# Patient Record
Sex: MALE | Race: WHITE | NOT HISPANIC OR LATINO | Employment: FULL TIME | URBAN - METROPOLITAN AREA
[De-identification: names, ages, dates, MRNs, and addresses within clinical notes are randomized per-mention and may not be internally consistent; named-entity substitution may affect disease eponyms.]

---

## 2022-08-30 ENCOUNTER — ANESTHESIA (OUTPATIENT)
Dept: PERIOP | Facility: HOSPITAL | Age: 47
End: 2022-08-30

## 2022-08-30 ENCOUNTER — HOSPITAL ENCOUNTER (OUTPATIENT)
Dept: PERIOP | Facility: HOSPITAL | Age: 47
Setting detail: OUTPATIENT SURGERY
Discharge: HOME | End: 2022-08-30
Attending: INTERNAL MEDICINE
Payer: COMMERCIAL

## 2022-08-30 ENCOUNTER — ANESTHESIA EVENT (OUTPATIENT)
Dept: PERIOP | Facility: HOSPITAL | Age: 47
End: 2022-08-30

## 2022-08-30 VITALS
RESPIRATION RATE: 15 BRPM | HEART RATE: 95 BPM | WEIGHT: 165 LBS | HEIGHT: 67 IN | OXYGEN SATURATION: 95 % | DIASTOLIC BLOOD PRESSURE: 76 MMHG | TEMPERATURE: 98 F | SYSTOLIC BLOOD PRESSURE: 162 MMHG | BODY MASS INDEX: 25.9 KG/M2

## 2022-08-30 DIAGNOSIS — R11.2 POST-OPERATIVE NAUSEA AND VOMITING: ICD-10-CM

## 2022-08-30 DIAGNOSIS — Z98.890 POST-OPERATIVE NAUSEA AND VOMITING: ICD-10-CM

## 2022-08-30 DIAGNOSIS — K44.9 HIATAL HERNIA: ICD-10-CM

## 2022-08-30 DIAGNOSIS — K21.9 GASTROESOPHAGEAL REFLUX DISEASE, UNSPECIFIED WHETHER ESOPHAGITIS PRESENT: ICD-10-CM

## 2022-08-30 DIAGNOSIS — G89.18 POST-OPERATIVE PAIN: Primary | ICD-10-CM

## 2022-08-30 PROCEDURE — 43450 DILATE ESOPHAGUS 1/MULT PASS: CPT | Performed by: INTERNAL MEDICINE

## 2022-08-30 PROCEDURE — 43210 EGD ESOPHAGOGASTRC FNDOPLSTY: CPT | Performed by: INTERNAL MEDICINE

## 2022-08-30 RX ORDER — GLYCOPYRROLATE 0.2 MG/ML
INJECTION INTRAMUSCULAR; INTRAVENOUS AS NEEDED
Status: DISCONTINUED | OUTPATIENT
Start: 2022-08-30 | End: 2022-08-30

## 2022-08-30 RX ORDER — SUCCINYLCHOLINE/SOD CL,ISO/PF 100 MG/5ML
SYRINGE (ML) INTRAVENOUS AS NEEDED
Status: DISCONTINUED | OUTPATIENT
Start: 2022-08-30 | End: 2022-08-30

## 2022-08-30 RX ORDER — PROPOFOL 10 MG/ML
INJECTION, EMULSION INTRAVENOUS CONTINUOUS PRN
Status: DISCONTINUED | OUTPATIENT
Start: 2022-08-30 | End: 2022-08-30

## 2022-08-30 RX ORDER — ACETAMINOPHEN AND CODEINE PHOSPHATE 300; 30 MG/1; MG/1
1 TABLET ORAL EVERY 4 HOURS PRN
Qty: 30 TABLET | Refills: 0 | Status: SHIPPED | OUTPATIENT
Start: 2022-08-30 | End: 2022-09-09

## 2022-08-30 RX ORDER — ONDANSETRON 2 MG/ML
4 INJECTION INTRAMUSCULAR; INTRAVENOUS ONCE AS NEEDED
Status: CANCELLED | OUTPATIENT
Start: 2022-08-30

## 2022-08-30 RX ORDER — FENTANYL CITRATE/PF 50 MCG/ML
25 SYRINGE (ML) INJECTION
Status: CANCELLED | OUTPATIENT
Start: 2022-08-30

## 2022-08-30 RX ORDER — DEXAMETHASONE SODIUM PHOSPHATE 10 MG/ML
INJECTION, SOLUTION INTRAMUSCULAR; INTRAVENOUS AS NEEDED
Status: DISCONTINUED | OUTPATIENT
Start: 2022-08-30 | End: 2022-08-30

## 2022-08-30 RX ORDER — ALBUTEROL SULFATE 2.5 MG/3ML
2.5 SOLUTION RESPIRATORY (INHALATION) ONCE AS NEEDED
Status: CANCELLED | OUTPATIENT
Start: 2022-08-30

## 2022-08-30 RX ORDER — ROCURONIUM BROMIDE 10 MG/ML
INJECTION, SOLUTION INTRAVENOUS AS NEEDED
Status: DISCONTINUED | OUTPATIENT
Start: 2022-08-30 | End: 2022-08-30

## 2022-08-30 RX ORDER — HYDROMORPHONE HCL/PF 1 MG/ML
0.5 SYRINGE (ML) INJECTION
Status: CANCELLED | OUTPATIENT
Start: 2022-08-30

## 2022-08-30 RX ORDER — FENTANYL CITRATE 50 UG/ML
INJECTION, SOLUTION INTRAMUSCULAR; INTRAVENOUS AS NEEDED
Status: DISCONTINUED | OUTPATIENT
Start: 2022-08-30 | End: 2022-08-30

## 2022-08-30 RX ORDER — SUCRALFATE ORAL 1 G/10ML
1 SUSPENSION ORAL 4 TIMES DAILY
Qty: 414 ML | Refills: 0 | Status: SHIPPED | OUTPATIENT
Start: 2022-08-30 | End: 2022-09-16 | Stop reason: SDUPTHER

## 2022-08-30 RX ORDER — ONDANSETRON 2 MG/ML
INJECTION INTRAMUSCULAR; INTRAVENOUS AS NEEDED
Status: DISCONTINUED | OUTPATIENT
Start: 2022-08-30 | End: 2022-08-30

## 2022-08-30 RX ORDER — LEVOFLOXACIN 5 MG/ML
500 INJECTION, SOLUTION INTRAVENOUS ONCE
Status: COMPLETED | OUTPATIENT
Start: 2022-08-30 | End: 2022-08-30

## 2022-08-30 RX ORDER — LIDOCAINE HYDROCHLORIDE 10 MG/ML
INJECTION, SOLUTION EPIDURAL; INFILTRATION; INTRACAUDAL; PERINEURAL AS NEEDED
Status: DISCONTINUED | OUTPATIENT
Start: 2022-08-30 | End: 2022-08-30

## 2022-08-30 RX ORDER — SODIUM CHLORIDE, SODIUM LACTATE, POTASSIUM CHLORIDE, CALCIUM CHLORIDE 600; 310; 30; 20 MG/100ML; MG/100ML; MG/100ML; MG/100ML
INJECTION, SOLUTION INTRAVENOUS CONTINUOUS PRN
Status: DISCONTINUED | OUTPATIENT
Start: 2022-08-30 | End: 2022-08-30

## 2022-08-30 RX ORDER — PROPOFOL 10 MG/ML
INJECTION, EMULSION INTRAVENOUS AS NEEDED
Status: DISCONTINUED | OUTPATIENT
Start: 2022-08-30 | End: 2022-08-30

## 2022-08-30 RX ORDER — MEPERIDINE HYDROCHLORIDE 25 MG/ML
12.5 INJECTION INTRAMUSCULAR; INTRAVENOUS; SUBCUTANEOUS
Status: CANCELLED | OUTPATIENT
Start: 2022-08-30

## 2022-08-30 RX ORDER — PROMETHAZINE HYDROCHLORIDE 25 MG/ML
12.5 INJECTION, SOLUTION INTRAMUSCULAR; INTRAVENOUS ONCE AS NEEDED
Status: CANCELLED | OUTPATIENT
Start: 2022-08-30

## 2022-08-30 RX ORDER — ONDANSETRON 4 MG/1
4 TABLET, FILM COATED ORAL EVERY 8 HOURS PRN
Qty: 20 TABLET | Refills: 0 | Status: SHIPPED | OUTPATIENT
Start: 2022-08-30

## 2022-08-30 RX ADMIN — Medication 50 MG: at 14:06

## 2022-08-30 RX ADMIN — ROCURONIUM BROMIDE 10 MG: 50 INJECTION, SOLUTION INTRAVENOUS at 14:25

## 2022-08-30 RX ADMIN — DEXAMETHASONE SODIUM PHOSPHATE 10 MG: 10 INJECTION INTRAMUSCULAR; INTRAVENOUS at 14:11

## 2022-08-30 RX ADMIN — ROCURONIUM BROMIDE 10 MG: 50 INJECTION, SOLUTION INTRAVENOUS at 14:13

## 2022-08-30 RX ADMIN — ONDANSETRON 4 MG: 2 INJECTION INTRAMUSCULAR; INTRAVENOUS at 14:11

## 2022-08-30 RX ADMIN — PROPOFOL 150 MCG/KG/MIN: 10 INJECTION, EMULSION INTRAVENOUS at 13:52

## 2022-08-30 RX ADMIN — PROPOFOL 200 MG: 10 INJECTION, EMULSION INTRAVENOUS at 13:50

## 2022-08-30 RX ADMIN — SODIUM CHLORIDE, SODIUM LACTATE, POTASSIUM CHLORIDE, AND CALCIUM CHLORIDE: .6; .31; .03; .02 INJECTION, SOLUTION INTRAVENOUS at 13:50

## 2022-08-30 RX ADMIN — FENTANYL CITRATE 50 MCG: 50 INJECTION, SOLUTION INTRAMUSCULAR; INTRAVENOUS at 14:14

## 2022-08-30 RX ADMIN — ROCURONIUM BROMIDE 10 MG: 50 INJECTION, SOLUTION INTRAVENOUS at 14:05

## 2022-08-30 RX ADMIN — Medication 100 MG: at 13:50

## 2022-08-30 RX ADMIN — FENTANYL CITRATE 50 MCG: 50 INJECTION, SOLUTION INTRAMUSCULAR; INTRAVENOUS at 13:50

## 2022-08-30 RX ADMIN — LEVOFLOXACIN: 5 INJECTION, SOLUTION INTRAVENOUS at 13:51

## 2022-08-30 RX ADMIN — GLYCOPYRROLATE 0.2 MCG: 0.2 INJECTION, SOLUTION INTRAMUSCULAR; INTRAVENOUS at 14:06

## 2022-08-30 RX ADMIN — LIDOCAINE HYDROCHLORIDE 50 MG: 10 INJECTION, SOLUTION EPIDURAL; INFILTRATION; INTRACAUDAL at 13:50

## 2022-08-30 RX ADMIN — ROCURONIUM BROMIDE 10 MG: 50 INJECTION, SOLUTION INTRAVENOUS at 13:50

## 2022-08-30 NOTE — ANESTHESIA POSTPROCEDURE EVALUATION
Post-Op Assessment Note    CV Status:  Stable  Pain Score: 0    Pain management: adequate     Mental Status:  Sleepy   Hydration Status:  Euvolemic   PONV Controlled:  Controlled   Airway Patency:  Patent      Post Op Vitals Reviewed: Yes      Staff: Anesthesiologist, CRNA         No complications documented      BP   149/87   Temp   97   Pulse  89   Resp   16   SpO2   96

## 2022-08-30 NOTE — H&P
History and Physical -  [facility]  Shabana Kolb 51 y o  male MRN: [id number]                  HPI: Shabana Kolb is a 51y o  year old male who presents for heartburn, reflux symptoms, nausea and vomiting, patient had extensive workup at [address] and confirmed evidence of GERD, he try multiple PPI but no improvement, he is here for transoral incision less fundoplication      REVIEW OF SYSTEMS: Per the HPI, and otherwise unremarkable      Historical Information   Past Medical History:   Diagnosis Date    COVID-19 long hauler manifesting chronic cough 10/2021    Intubation x 11 weeks and PEG     Past Surgical History:   Procedure Laterality Date    COLONOSCOPY  10/2019    Diverticulosis    EGD  01/25/2022    Mild irregular squamocolumnar junction no intestinal metaplasia, gastritis prepyloric, widely patent lower esophageal sphincter, small sliding hiatal hernia, fundic gland polyps    EGD  06/02/2022    Variable Z line and biopsy negative for intestinal metaplasia, gastritis with fundic gland polyp esophagus otherwise normal at [address] ENDOSCOPY  10/2019    Possible short segment Bajwa's esophagus-biopsy negative for intestinal metaplasia, fundic gland polyps, focal eosinophils and esophagus normal duodenum     Social History   Social History     Substance and Sexual Activity   Alcohol Use Not Currently     Social History     Substance and Sexual Activity   Drug Use Not on file     Social History     Tobacco Use   Smoking Status Never Smoker   Smokeless Tobacco Never Used     Family History   Problem Relation Age of Onset    Ovarian cancer Mother        Meds/Allergies     (Not in a hospital admission)      Allergies   Allergen Reactions    Penicillins Hives    Vancomycin Chest Pain       Objective     /96   Pulse 90   Temp 99 2 °F (37 3 °C) (Temporal)   Resp 14   Ht 5' 7" (1 702 m)   Wt 74 8 kg (165 lb) SpO2 97%   BMI 25 84 kg/m²       PHYSICAL EXAM    Gen: NAD  CV: RRR  CHEST: Clear  ABD: soft, NT/ND  EXT: no edema      ASSESSMENT/PLAN:  This is a 51y o  year old male here for TIF, and he is stable and optimized for his procedure

## 2022-08-30 NOTE — ANESTHESIA PREPROCEDURE EVALUATION
Procedure:  TRANSORAL INCISIONLESS FUNDOPLICATION (TIF)    Relevant Problems   No relevant active problems      GERD  Physical Exam    Airway    Mallampati score: II  TM Distance: >3 FB  Neck ROM: full     Dental       Cardiovascular  Cardiovascular exam normal    Pulmonary  Pulmonary exam normal     Other Findings        Anesthesia Plan  ASA Score- 2     Anesthesia Type- general with ASA Monitors  Additional Monitors:   Airway Plan: ETT  Plan Factors-Exercise tolerance (METS): >4 METS  Chart reviewed  EKG reviewed  Imaging results reviewed  Existing labs reviewed  Patient summary reviewed  Patient is not a current smoker  Patient did not smoke on day of surgery  Obstructive sleep apnea risk education given perioperatively  Induction- intravenous  Postoperative Plan- Plan for postoperative opioid use  Planned trial extubation    Informed Consent- Anesthetic plan and risks discussed with patient  I personally reviewed this patient with the CRNA  Discussed and agreed on the Anesthesia Plan with the SUE Wood

## 2022-09-07 ENCOUNTER — TELEPHONE (OUTPATIENT)
Dept: GASTROENTEROLOGY | Facility: CLINIC | Age: 47
End: 2022-09-07

## 2022-09-08 NOTE — TELEPHONE ENCOUNTER
Reba Sahu called  I scheduled his TIF follow up  Reba Sahu expressed understand of location/date an time of appointment  coffee coffee/tea

## 2022-09-16 ENCOUNTER — OFFICE VISIT (OUTPATIENT)
Dept: GASTROENTEROLOGY | Facility: CLINIC | Age: 47
End: 2022-09-16
Payer: COMMERCIAL

## 2022-09-16 VITALS
HEIGHT: 67 IN | BODY MASS INDEX: 25.43 KG/M2 | HEART RATE: 106 BPM | SYSTOLIC BLOOD PRESSURE: 148 MMHG | WEIGHT: 162 LBS | DIASTOLIC BLOOD PRESSURE: 107 MMHG

## 2022-09-16 DIAGNOSIS — K22.4 ESOPHAGEAL DYSMOTILITY: ICD-10-CM

## 2022-09-16 DIAGNOSIS — R05.3 COVID-19 LONG HAULER MANIFESTING CHRONIC COUGH: ICD-10-CM

## 2022-09-16 DIAGNOSIS — R13.12 OROPHARYNGEAL DYSPHAGIA: ICD-10-CM

## 2022-09-16 DIAGNOSIS — R05.9 COUGH: ICD-10-CM

## 2022-09-16 DIAGNOSIS — U09.9 COVID-19 LONG HAULER MANIFESTING CHRONIC COUGH: ICD-10-CM

## 2022-09-16 DIAGNOSIS — K21.9 GASTROESOPHAGEAL REFLUX DISEASE, UNSPECIFIED WHETHER ESOPHAGITIS PRESENT: ICD-10-CM

## 2022-09-16 DIAGNOSIS — K21.9 GASTROESOPHAGEAL REFLUX DISEASE WITHOUT ESOPHAGITIS: Primary | ICD-10-CM

## 2022-09-16 DIAGNOSIS — K44.9 HIATAL HERNIA: ICD-10-CM

## 2022-09-16 DIAGNOSIS — R07.0 THROAT BURNING: ICD-10-CM

## 2022-09-16 PROCEDURE — 99214 OFFICE O/P EST MOD 30 MIN: CPT | Performed by: INTERNAL MEDICINE

## 2022-09-16 RX ORDER — SUCRALFATE ORAL 1 G/10ML
1 SUSPENSION ORAL 4 TIMES DAILY
Qty: 414 ML | Refills: 2 | Status: SHIPPED | OUTPATIENT
Start: 2022-09-16

## 2022-09-16 NOTE — PROGRESS NOTES
[facility] - Outpatient Follow-up Note  Shabana Kolb 51 y o  male MRN: [id number]  Encounter: [id number]          ASSESSMENT AND PLAN:      1  Gastroesophageal reflux disease without esophagitis  Long history of GERD, status post TIF, transoral incision less fundoplication 2 weeks ago, patient tolerated procedure very well  Postprocedure he follow liquid diet for 1 week and now advanced to soft diet  He denies any dysphagia or odynophagia, no heartburn or reflux symptoms, he is still taking Prevacid which I advised him to discontinued, he will continue with liquid Carafate for another 2-3 weeks and then wean off from all the reflux medication  He will need repeat EGD in 6 months to evaluate for fundoplication site  - EGD; Future    2  Gastroesophageal reflux disease, unspecified whether esophagitis present  Status post TIF, 30 fastener  were applied circumferentially around gastroesophageal junction to create 902 fundoplication wrap  His reflux symptoms are well controlled currently denying any symptoms, GERD quality of life score is almost 0, no nocturnal symptoms or no trouble with swallowing  Will give liquid Carafate refill for another 1 month and then he will stop all the reflux medication  - sucralfate (CARAFATE) 1 g/10 mL suspension; Take 10 mL (1 g total) by mouth 4 (four) times a day  Dispense: 414 mL; Refill: 2    3  Hiatal hernia  Small sliding hill  grade 1 hiatal hernia, status post TIF  - sucralfate (CARAFATE) 1 g/10 mL suspension; Take 10 mL (1 g total) by mouth 4 (four) times a day  Dispense: 414 mL; Refill: 2  - EGD; Future    ______________________________________________________________________    SUBJECTIVE:  Patient seen and examined, he come for follow-up    He had a at transoral incision less fundoplication 2 weeks ago at [facility], he was referred to me by Dr Barbie Altamirano because of persistent heartburn and reflux symptoms, patient had extensive workup at [address] including manometry and pH study which confirm breakthrough reflux while on PPI and H2 blocker so TIF was recommended  He had a COVID infection last year, he was status post trach and PEG which was removed, esophageal intubation with  TIF device was challenging but a procedure was done without any complication  Postprocedure he follow TIF diet, he is feeling well, currently denies any throat pain, no abdominal pain, no heartburn or reflux symptoms, he denies any dysphagia or odynophagia      REVIEW OF SYSTEMS IS OTHERWISE NEGATIVE        Historical Information   Past Medical History:   Diagnosis Date    COVID-19 long hauler manifesting chronic cough 10/2021    Intubation x 11 weeks and PEG    GERD (gastroesophageal reflux disease)     Hiatal hernia      Past Surgical History:   Procedure Laterality Date    COLONOSCOPY  10/2019    Diverticulosis    EGD  01/25/2022    Mild irregular squamocolumnar junction no intestinal metaplasia, gastritis prepyloric, widely patent lower esophageal sphincter, small sliding hiatal hernia, fundic gland polyps    EGD  06/02/2022    Variable Z line and biopsy negative for intestinal metaplasia, gastritis with fundic gland polyp esophagus otherwise [facility]    TRANSORAL INCISIONLESS FUNDOPLICATION (TIF)  93/88/2039    UPPER GASTROINTESTINAL ENDOSCOPY  10/2019    Possible short segment Bajwa's esophagus-biopsy negative for intestinal metaplasia, fundic gland polyps, focal eosinophils and esophagus normal duodenum     Social History   Social History     Substance and Sexual Activity   Alcohol Use Not Currently     Social History     Substance and Sexual Activity   Drug Use Never     Social History     Tobacco Use   Smoking Status Never Smoker   Smokeless Tobacco Never Used     Family History   Problem Relation Age of Onset    Ovarian cancer Mother        Meds/Allergies       Current Outpatient Medications:     albuterol (PROVENTIL HFA,VENTOLIN HFA) 90 mcg/act inhaler    ALPRAZolam (XANAX) 1 mg tablet    budesonide (PULMICORT) 0 5 mg/2 mL nebulizer solution    busPIRone (BUSPAR) 5 mg tablet    famotidine (PEPCID) 40 MG tablet    fluticasone (FLONASE) 50 mcg/act nasal spray    lansoprazole (PREVACID) 30 mg capsule    sucralfate (CARAFATE) 1 g/10 mL suspension    traZODone (DESYREL) 150 mg tablet    amitriptyline (ELAVIL) 100 mg tablet    azithromycin (ZITHROMAX) 250 mg tablet    ondansetron (ZOFRAN) 4 mg tablet    promethazine-dextromethorphan (PHENERGAN-DM) 6 25-15 mg/5 mL oral syrup    tadalafil (CIALIS) 5 MG tablet    valACYclovir (VALTREX) 500 mg tablet    Allergies   Allergen Reactions    Penicillins Hives    Vancomycin Chest Pain           Objective     Blood pressure (!) 148/107, pulse (!) 106, height 5' 7" (1 702 m), weight 73 5 kg (162 lb)  Body mass index is 25 37 kg/m²  PHYSICAL EXAM:      General Appearance:   Alert, cooperative, no distress   HEENT:   Normocephalic, atraumatic, anicteric      Neck:  Supple, symmetrical, trachea midline   Lungs:   Clear to auscultation bilaterally; no rales, rhonchi or wheezing; respirations unlabored    Heart[de-identified]   Regular rate and rhythm; no murmur, rub, or gallop  Abdomen:   Soft, non-tender, non-distended; normal bowel sounds; no masses, no organomegaly    Genitalia:   Deferred    Rectal:   Deferred    Extremities:  No cyanosis, clubbing or edema    Pulses:  2+ and symmetric    Skin:  No jaundice, rashes, or lesions    Lymph nodes:  No palpable cervical lymphadenopathy        Lab Results:   No visits with results within 1 Day(s) from this visit  Latest known visit with results is:   No results found for any previous visit           Radiology Results:   Transoral incisionless fundoplication (TIF)    Result Date: 8/30/2022  Narrative: [address] [address] [phone] DATE OF SERVICE: 8/30/22 PHYSICIAN(S): Attending: Angeli Moran MD Fellow: No Staff Documented Procedure :  EGD with Transoral incision less fundoplication INDICATION: Hiatal hernia, Gastroesophageal reflux disease, unspecified whether esophagitis present POST-OP DIAGNOSIS: See the impression below  PREPROCEDURE: Informed consent was obtained for the procedure, including sedation  Risks of perforation, hemorrhage, adverse drug reaction and aspiration were discussed  The patient was placed in the left lateral decubitus position  Patient was explained about the risks and benefits of the procedure  Risks including but not limited to bleeding, infection, and perforation were explained in detail  Also explained about less than 100% sensitivity with the exam and other alternatives  DETAILS OF PROCEDURE: Patient was taken to the procedure room where a time out was performed to confirm correct patient and correct procedure  The patient underwent general anesthesia, which was administered by an anesthesia professional  The patient's blood pressure, heart rate, level of consciousness, oxygen and respirations were monitored throughout the procedure  The scope was advanced to the second part of the duodenum  Retroflexion was performed in the fundus  Prior to the procedure, the patient's H  Pylori status was unknown  The patient experienced no blood loss  The procedure was not difficult  The patient tolerated the procedure well  There were no apparent complications    ANESTHESIA INFORMATION: ASA: II Anesthesia Type: General FINDINGS: Patient was intubated with small endotracheal tube, 6 5 and then put on left lateral position  Upper endoscopy was passed through the mouth and upper esophageal sphincter was intubated  Upper, middle and lower esophagus appeared normal, GE junction was 40 cm from incisor, GE junction was carefully examined, scope was advanced in the stomach, on retroflexion, there is small sliding hill grade 1 hiatal hernia     Greater curvature, lesser curvature, fundus, gastric body, antrum was examined which appeared normal   Scope was advanced from pylorus to duodenal bulb which appeared normal, 2nd part of duodenum were duodenum villi appeared normal   Scope was withdrawn and esophyz-Z+ device was open loaded with fastener cartridge and scope channel was lubricated with mineral oil  Esophageal Bougie Dilatation done with 60 fr bougie dilator  Scope along with device was passed through the mouth, head was extended and cricopharynx was intubated with the scope  Device along with the scope was passed through the upper esophagus and advanced from GE junction into stomach, on retroflexion, tissue arm mold was visualized  Starting from 6 o'clock position, with the use of tissue invaginater, tip of the gastroesophageal junction valve was grabbed, device was rotated to clockwise to create traction on the gastroesophageal junction and, tissue arm mold was closed and 4 fasteners were applied  Tissue arm mold was rotated towards 11 o'clock position, tip of the gastroesophageal junction valve was grabbed with tissue invaginated again device was rotated clockwise to create traction on the gastroesophageal junction while an additional 6 fasteners were applied at the 10 and 11 o'clock position  Tissue arm mold was rotated towards 7 o'clock position and an additional 4 fasteners were applied  Tissue arm mold was rotated towards 5 o'clock position and 4 fasteners were applied  Device along with a tissue arm mold was rotated towards 1 o'clock position and again with tissue invaginator, gastroesophageal junction valve tip was grabbed  Device was rotated counter clockwise direction to create traction on the gastroesophageal junction and additional 8 fastener were applied at 1 and 2 o'clock position  Total of 26 fasteners were applied circumferentially around the gastroesophageal junction to create 551 degree fundoplication wrap    Scope along with the device was easily removed, scope was reintroduced and gastroesophageal junction was carefully examined  Procedure finished without any complication  Impression: Chronic GERD, small hill grade 1 hiatal hernia, status post transoral incision less fundoplication, total 26 fastener  were applied circumferentially around gastroesophageal junction to create 227 degree fundoplication wrap RECOMMENDATION: Full liquid diet for 2 weeks and then advanced to soft diet Tylenol with codeine twice a day p r n  for pain, Zofran 4 mg p o   Q 6 p r n  for nausea, famotidine 40 mg p o  q day and liquid Carafate 1 g 3 times a day Follow-up office visit in 2-3 weeks   Tatianna Romano MD

## 2023-09-13 ENCOUNTER — TELEPHONE (OUTPATIENT)
Age: 48
End: 2023-09-13

## 2023-09-13 ENCOUNTER — HOSPITAL ENCOUNTER (OUTPATIENT)
Dept: RADIOLOGY | Facility: HOSPITAL | Age: 48
Discharge: HOME | End: 2023-09-13
Attending: ORTHOPAEDIC SURGERY
Payer: COMMERCIAL

## 2023-09-13 VITALS — WEIGHT: 172 LBS | BODY MASS INDEX: 27 KG/M2 | HEIGHT: 67 IN

## 2023-09-13 DIAGNOSIS — M25.511 RIGHT SHOULDER PAIN, UNSPECIFIED CHRONICITY: ICD-10-CM

## 2023-09-13 DIAGNOSIS — M19.011 PRIMARY OSTEOARTHRITIS OF RIGHT SHOULDER: Primary | ICD-10-CM

## 2023-09-13 PROCEDURE — 73030 X-RAY EXAM OF SHOULDER: CPT

## 2023-09-13 PROCEDURE — 99204 OFFICE O/P NEW MOD 45 MIN: CPT | Performed by: ORTHOPAEDIC SURGERY

## 2023-09-13 NOTE — TELEPHONE ENCOUNTER
Caller: Patient     Doctor: Dr. Arnaud Ortiz    Reason for call: Patient calling to inform prior authorization that he has MRI scheduled for 9/21/23. Patient asking if he can be informed if the MRI is approved. He can be reached at number below.      Call back#: [phone]

## 2023-09-13 NOTE — PROGRESS NOTES
[address]  [address] [place] [id number]  Hilario Dykes  [id number]  1975    ORTHOPAEDIC SURGERY OUTPATIENT NOTE  9/13/2023      HISTORY:  49 y.o. male presents with a chief complaint of right shoulder pain. The pain began a few year(s) ago and is not associated with an acute injury. The patient describes the pain as aching and dull in intensity,  intermittent, occurring with increasing frequency, awakening patient from sleep in timing, and localizes the pain to the  right glenohumeral joint, deltoid. The pain is worse with movement, overhead work and overuse and relieved by rest, ice, avoiding the painful activities. The pain is not associated with numbness and tingling. The pain is not associated with constitutional symptoms. The patient is awoken at night by the pain. Pain level 5-6/10 on a daily basis  The patient has had prior treatment in the form of PRP, visco and cortisone injections with minimal benefit. He also notes a previous history of a labral debridement and rotator cuff repair with Dr Oscar Mills at [place] in 2011 without noted benefit of his pain.        Past Medical History:   Diagnosis Date   • COVID-19 long hauler manifesting chronic cough 10/2021    Intubation x 11 weeks and PEG   • GERD (gastroesophageal reflux disease)    • Hiatal hernia        Past Surgical History:   Procedure Laterality Date   • COLONOSCOPY  10/2019    Diverticulosis   • EGD  01/25/2022    Mild irregular squamocolumnar junction no intestinal metaplasia, gastritis prepyloric, widely patent lower esophageal sphincter, small sliding hiatal hernia, fundic gland polyps   • EGD  06/02/2022    Variable Z line and biopsy negative for intestinal metaplasia, gastritis with fundic gland polyp esophagus otherwise normal at [facility]   • TRANSORAL INCISIONLESS FUNDOPLICATION (TIF)  71/78/2579   • UPPER GASTROINTESTINAL ENDOSCOPY  10/2019    Possible short segment Bajwa's esophagus-biopsy negative for intestinal metaplasia, fundic gland polyps, focal eosinophils and esophagus normal duodenum       Social History     Socioeconomic History   • Marital status: Single     Spouse name: Not on file   • Number of children: Not on file   • Years of education: Not on file   • Highest education level: Not on file   Occupational History   • Not on file   Tobacco Use   • Smoking status: Never   • Smokeless tobacco: Never   Vaping Use   • Vaping Use: Never used   Substance and Sexual Activity   • Alcohol use: Not Currently   • Drug use: Never   • Sexual activity: Not on file   Other Topics Concern   • Not on file   Social History Narrative   • Not on file     Social Determinants of Health     Financial Resource Strain: Not on file   Food Insecurity: Not on file   Transportation Needs: Not on file   Physical Activity: Not on file   Stress: Not on file   Social Connections: Not on file   Intimate Partner Violence: Not on file   Housing Stability: Not on file       Family History   Problem Relation Age of Onset   • Ovarian cancer Mother         Patient's Medications   New Prescriptions    No medications on file   Previous Medications    ALBUTEROL (PROVENTIL HFA,VENTOLIN HFA) 90 MCG/ACT INHALER        ALPRAZOLAM (XANAX) 1 MG TABLET        AMITRIPTYLINE (ELAVIL) 100 MG TABLET        AZITHROMYCIN (ZITHROMAX) 250 MG TABLET        BUDESONIDE (PULMICORT) 0.5 MG/2 ML NEBULIZER SOLUTION    Inhale 2 mL every 12 (twelve) hours    BUSPIRONE (BUSPAR) 5 MG TABLET        FAMOTIDINE (PEPCID) 40 MG TABLET    Take 1 tablet (40 mg total) by mouth 2 (two) times a day    FLUTICASONE (FLONASE) 50 MCG/ACT NASAL SPRAY        LANSOPRAZOLE (PREVACID) 30 MG CAPSULE    Take 30 mg by mouth 2 (two) times a day    ONDANSETRON (ZOFRAN) 4 MG TABLET    Take 1 tablet (4 mg total) by mouth every 8 (eight) hours as needed for nausea or vomiting PROMETHAZINE-DEXTROMETHORPHAN (PHENERGAN-DM) 6.25-15 MG/5 ML ORAL SYRUP        SUCRALFATE (CARAFATE) 1 G/10 ML SUSPENSION    Take 10 mL (1 g total) by mouth 4 (four) times a day    TADALAFIL (CIALIS) 5 MG TABLET        TRAZODONE (DESYREL) 150 MG TABLET        VALACYCLOVIR (VALTREX) 500 MG TABLET    Take 1,000 mg by mouth   Modified Medications    No medications on file   Discontinued Medications    No medications on file       Allergies   Allergen Reactions   • Penicillins Hives   • Vancomycin Chest Pain        Ht 5' 7" (1.702 m)   Wt 78 kg (172 lb)   BMI 26.94 kg/m²      REVIEW OF SYSTEMS:  Constitutional: Negative. HEENT: Negative. Respiratory: Negative. Skin: Negative. Neurological: Negative. Psychiatric/Behavioral: Negative. Musculoskeletal: Negative except for that mentioned in the HPI. PHYSICAL EXAM:      RIGHT SHOULDER:     NVI axillary/medial/radial/ulnar and sensory intact     Forward flexion:   90 degrees   Abduction:  45 degrees   External rotation at 0 degrees:  Neutral  Internal rotation: L5     STRENGTH:  Forward flexion:  5/5   Abduction:  5/5   External rotation:  5/5   Internal rotation:  5/5     Reflexes:  Brachioradialis:  Symmetric bilaterally  Triceps: Symmetric bilaterally  Biceps: Symmetric bilaterally  Patella tendon: Symmetric bilaterally  Achilles tendon: Symmetric bilaterally  Babinski's: Negative  Alfreda sign: Negative     No scapular winging or dyskinesis     Capillary refill brisk   Full ROM of elbows bilaterally      Skin:  No ecchymosis/abrasion/erythema/warmth    IMAGING: X Ray Right Shoulder 9/13/23 demonstrates severe glenohumeral joint osteoarthritis with inferior osteophytes. MRI Right Shoulder 10/22/22, report only available to view which demonstrates long head biceps tendon rupture. Intact rotator cuff. Severe glenohumeral joint osteoarthritis. ASSESSMENT AND PLAN: 49 y.o. male with severe glenohumeral joint osteoarthritis of his right shoulder. The pathoanatomy and natural history of this diagnosis was explained to the patient in detail today in the office. The surgical procedure that would be beneficial to the patient would be a total shoulder arthroplasty, most likely an anatomic as his rotator cuff appears to be intact. We will obtain a CT scan with blue print and 3D recon of the right shoulder. If his glenoid deformity is more significant then it would be a reverse total shoulder. He understood and all questions were answered. He would like to go home and review his options but is planning on a surgical procedure for sometime in December. He was instructed to follow up 4-6 weeks prior to desired surgical date to sign informed consent.        Scribe Attestation    I,:  Mireya Arango am acting as a scribe while in the presence of the attending physician.:       I,:  Kirk Garg personally performed the services described in this documentation    as scribed in my presence.:

## 2023-09-20 NOTE — TELEPHONE ENCOUNTER
Caller: Patient    Doctor:  Cathy Pathak    Reason for call:     Patient requesting order for CT Scan for M19.011 (ICD-10-CM) - Primary osteoarthritis of right shouldered faxed to [facility], Please fax to the fax #  [phone]    Call back#: [phone]

## 2025-01-27 ENCOUNTER — TELEPHONE (OUTPATIENT)
Age: 50
End: 2025-01-27

## 2025-01-27 NOTE — TELEPHONE ENCOUNTER
Patients GI provider:  Dr. Medina    Number to return call: [phone]    Reason for call: Pt had Tif procedure with Dr. Li in 2022, states he has had trouble eating for the past 12 weeks. Since the provider is no longer with us he is looking for help. Please call him to discuss next steps for treatment and/or OV with Dr. Medina.      Scheduled procedure/appointment date if applicable: NA

## 2025-01-28 NOTE — TELEPHONE ENCOUNTER
Patient is dealing with insurance issues, requested to put appt off to following week; may call back to cancel if he is unable to get insurance to approve visit.

## 2025-01-28 NOTE — TELEPHONE ENCOUNTER
Olivier Medina MD  You 18 hours ago (3:26 PM)       Friday      You  Olivier Medina MD 19 hours ago (2:29 PM)     BEA  Do you have any specific time you would like me to schedule this patient?  Thank you,  Collette

## 2025-02-07 ENCOUNTER — TELEPHONE (OUTPATIENT)
Dept: GASTROENTEROLOGY | Facility: MEDICAL CENTER | Age: 50
End: 2025-02-07

## 2025-02-07 ENCOUNTER — OFFICE VISIT (OUTPATIENT)
Dept: GASTROENTEROLOGY | Facility: MEDICAL CENTER | Age: 50
End: 2025-02-07
Payer: COMMERCIAL

## 2025-02-07 VITALS
WEIGHT: 180.4 LBS | BODY MASS INDEX: 28.25 KG/M2 | DIASTOLIC BLOOD PRESSURE: 96 MMHG | SYSTOLIC BLOOD PRESSURE: 144 MMHG | TEMPERATURE: 96.9 F | HEART RATE: 82 BPM

## 2025-02-07 DIAGNOSIS — K21.00 GASTROESOPHAGEAL REFLUX DISEASE WITH ESOPHAGITIS WITHOUT HEMORRHAGE: ICD-10-CM

## 2025-02-07 DIAGNOSIS — K44.9 HIATAL HERNIA: ICD-10-CM

## 2025-02-07 DIAGNOSIS — Z12.11 SCREENING FOR COLON CANCER: Primary | ICD-10-CM

## 2025-02-07 DIAGNOSIS — K21.9 GASTROESOPHAGEAL REFLUX DISEASE, UNSPECIFIED WHETHER ESOPHAGITIS PRESENT: ICD-10-CM

## 2025-02-07 PROCEDURE — 99214 OFFICE O/P EST MOD 30 MIN: CPT | Performed by: INTERNAL MEDICINE

## 2025-02-07 RX ORDER — SODIUM CHLORIDE, SODIUM LACTATE, POTASSIUM CHLORIDE, CALCIUM CHLORIDE 600; 310; 30; 20 MG/100ML; MG/100ML; MG/100ML; MG/100ML
125 INJECTION, SOLUTION INTRAVENOUS CONTINUOUS
Status: CANCELLED | OUTPATIENT
Start: 2025-02-07

## 2025-02-07 RX ORDER — SUCRALFATE ORAL 1 G/10ML
1 SUSPENSION ORAL 4 TIMES DAILY
Qty: 400 ML | Refills: 0 | Status: SHIPPED | OUTPATIENT
Start: 2025-02-07 | End: 2025-02-18

## 2025-02-07 RX ORDER — FAMOTIDINE 40 MG/1
40 TABLET, FILM COATED ORAL
Qty: 90 TABLET | Refills: 1 | Status: SHIPPED | OUTPATIENT
Start: 2025-02-07 | End: 2025-08-06

## 2025-02-07 RX ORDER — LANSOPRAZOLE 30 MG/1
30 CAPSULE, DELAYED RELEASE ORAL 2 TIMES DAILY
Qty: 180 CAPSULE | Refills: 0 | Status: SHIPPED | OUTPATIENT
Start: 2025-02-07 | End: 2025-05-08

## 2025-02-07 NOTE — ASSESSMENT & PLAN NOTE
He had a colonosocpy done about 5 years ago. No symptoms. No polyps. Repeat in 5 years.   Orders:    Colonoscopy; Future

## 2025-02-07 NOTE — PROGRESS NOTES
Name: Huy Gunter Jr      : 1975      MRN: [id number]  Encounter Provider: Olivier Medina MD  Encounter Date: 2025   Encounter department: [facility]  :  Assessment & Plan  Gastroesophageal reflux disease, unspecified whether esophagitis present    He did have a TIF in the past which worked but now his symptoms are back.   Continue PPI and add carafate.   Will need evaluation of esophagitis/ hernia and plan on redo therapy (likely surgical fundoplication).   Orders:    sucralfate (CARAFATE) 1 g/10 mL suspension; Take 10 mL (1 g total) by mouth 4 (four) times a day for 10 days    Hiatal hernia    Plan for EGD   ENT evaluation.     Orders:    lansoprazole (PREVACID) 30 mg capsule; Take 1 capsule (30 mg total) by mouth 2 (two) times a day    sucralfate (CARAFATE) 1 g/10 mL suspension; Take 10 mL (1 g total) by mouth 4 (four) times a day for 10 days    EGD; Future    Ambulatory Referral to Otolaryngology; Future    Gastroesophageal reflux disease with esophagitis without hemorrhage    Orders:    lansoprazole (PREVACID) 30 mg capsule; Take 1 capsule (30 mg total) by mouth 2 (two) times a day    famotidine (PEPCID) 40 MG tablet; Take 1 tablet (40 mg total) by mouth daily at bedtime    EGD; Future    Ambulatory Referral to Otolaryngology; Future    Screening for colon cancer  He had a colonosocpy done about 5 years ago. No symptoms. No polyps. Repeat in 5 years.   Orders:    Colonoscopy; Future        History of Present Illness   HPI  Huy Gunter Jr is a 49 y.o. male who presents ongoing GERD/ LPR symptoms.       He had COVID and had to be intubated in Oct 2021. Was intubated for 16 weeks.   He had a TIF procedure in .   He was on prevacid 15mg tid and has regurgitation. It does help slightly.   He has significant reflux.   Also has sinus issues.   Has shortness of breath when walking.   He has a bed that can be raised but symptoms are getting worse.   He has  aspiration.   He has hoarseness.   He has globus sensation and was on amitriptyline but is not taking it constantly.   No dysphagia.   No diarrhea/ constipation/ bleeding.   He had work up done at [facility] for the reflux as well. Was recommended TIF. He finally ahd it done at [facility] by Dr. Li.   6 months after procedure he had symptoms again.         Review of Systems   Constitutional: Negative.    HENT: Negative.     Eyes: Negative.    Respiratory: Negative.     Cardiovascular: Negative.    Gastrointestinal:         See HPI   Endocrine: Negative.    Genitourinary: Negative.    Musculoskeletal: Negative.    Skin: Negative.    Allergic/Immunologic: Negative.    Neurological: Negative.    Hematological: Negative.    Psychiatric/Behavioral: Negative.     All other systems reviewed and are negative.         Objective   /96   Pulse 82   Temp (!) 96.9 °F (36.1 °C)   Wt 81.8 kg (180 lb 6.4 oz)   BMI 28.25 kg/m²      Physical Exam  Constitutional:       Appearance: Normal appearance. He is well-developed.   HENT:      Head: Normocephalic and atraumatic.   Eyes:      General: No scleral icterus.     Conjunctiva/sclera: Conjunctivae normal.      Pupils: Pupils are equal, round, and reactive to light.   Cardiovascular:      Rate and Rhythm: Normal rate and regular rhythm.      Heart sounds: Normal heart sounds.   Pulmonary:      Effort: Pulmonary effort is normal. No respiratory distress.      Breath sounds: Normal breath sounds.   Abdominal:      General: Bowel sounds are normal. There is no distension.      Palpations: Abdomen is soft. There is no mass.      Tenderness: There is no abdominal tenderness.      Hernia: No hernia is present.   Musculoskeletal:         General: Normal range of motion.      Cervical back: Normal range of motion.   Lymphadenopathy:      Cervical: No cervical adenopathy.   Skin:     General: Skin is warm.   Neurological:      Mental Status: He is alert and oriented to person, place,  and time.   Psychiatric:         Behavior: Behavior normal.         Thought Content: Thought content normal.

## 2025-02-07 NOTE — TELEPHONE ENCOUNTER
Procedure: EGD  Date: 2/17/25  Physician performing: Dr. Medina  Location of procedure:  [place]  Instructions given to patient: EGD  Diabetic: No  Clearances: N/A    Pt scheduled and okay'd by

## 2025-02-17 RX ORDER — ALBUTEROL SULFATE 0.83 MG/ML
2.5 SOLUTION RESPIRATORY (INHALATION) ONCE AS NEEDED
OUTPATIENT
Start: 2025-02-17

## 2025-02-17 RX ORDER — ONDANSETRON 2 MG/ML
4 INJECTION INTRAMUSCULAR; INTRAVENOUS ONCE AS NEEDED
OUTPATIENT
Start: 2025-02-17

## 2025-02-17 RX ORDER — SODIUM CHLORIDE 9 MG/ML
125 INJECTION, SOLUTION INTRAVENOUS CONTINUOUS
Status: CANCELLED | OUTPATIENT
Start: 2025-02-17

## 2025-02-18 ENCOUNTER — ANESTHESIA EVENT (OUTPATIENT)
Dept: GASTROENTEROLOGY | Facility: HOSPITAL | Age: 50
End: 2025-02-18
Payer: COMMERCIAL

## 2025-02-18 ENCOUNTER — HOSPITAL ENCOUNTER (OUTPATIENT)
Dept: GASTROENTEROLOGY | Facility: HOSPITAL | Age: 50
Setting detail: OUTPATIENT SURGERY
Discharge: HOME | End: 2025-02-18
Attending: INTERNAL MEDICINE
Payer: COMMERCIAL

## 2025-02-18 ENCOUNTER — ANESTHESIA (OUTPATIENT)
Dept: GASTROENTEROLOGY | Facility: HOSPITAL | Age: 50
End: 2025-02-18
Payer: COMMERCIAL

## 2025-02-18 VITALS
OXYGEN SATURATION: 97 % | RESPIRATION RATE: 20 BRPM | HEART RATE: 78 BPM | SYSTOLIC BLOOD PRESSURE: 110 MMHG | DIASTOLIC BLOOD PRESSURE: 65 MMHG | TEMPERATURE: 98.1 F

## 2025-02-18 DIAGNOSIS — R05.3 COVID-19 LONG HAULER MANIFESTING CHRONIC COUGH: Primary | ICD-10-CM

## 2025-02-18 DIAGNOSIS — K21.00 GASTROESOPHAGEAL REFLUX DISEASE WITH ESOPHAGITIS WITHOUT HEMORRHAGE: ICD-10-CM

## 2025-02-18 DIAGNOSIS — U09.9 COVID-19 LONG HAULER MANIFESTING CHRONIC COUGH: Primary | ICD-10-CM

## 2025-02-18 DIAGNOSIS — K44.9 HIATAL HERNIA: ICD-10-CM

## 2025-02-18 PROCEDURE — 88305 TISSUE EXAM BY PATHOLOGIST: CPT | Performed by: STUDENT IN AN ORGANIZED HEALTH CARE EDUCATION/TRAINING PROGRAM

## 2025-02-18 PROCEDURE — 43239 EGD BIOPSY SINGLE/MULTIPLE: CPT | Performed by: INTERNAL MEDICINE

## 2025-02-18 RX ORDER — SODIUM CHLORIDE 9 MG/ML
125 INJECTION, SOLUTION INTRAVENOUS CONTINUOUS
Status: DISCONTINUED | OUTPATIENT
Start: 2025-02-18 | End: 2025-02-22 | Stop reason: HOSPADM

## 2025-02-18 RX ORDER — SODIUM CHLORIDE, SODIUM LACTATE, POTASSIUM CHLORIDE, CALCIUM CHLORIDE 600; 310; 30; 20 MG/100ML; MG/100ML; MG/100ML; MG/100ML
125 INJECTION, SOLUTION INTRAVENOUS CONTINUOUS
Status: DISCONTINUED | OUTPATIENT
Start: 2025-02-18 | End: 2025-02-22 | Stop reason: HOSPADM

## 2025-02-18 RX ORDER — PROPOFOL 10 MG/ML
INJECTION, EMULSION INTRAVENOUS CONTINUOUS PRN
Status: DISCONTINUED | OUTPATIENT
Start: 2025-02-18 | End: 2025-02-18

## 2025-02-18 RX ORDER — PROPOFOL 10 MG/ML
INJECTION, EMULSION INTRAVENOUS AS NEEDED
Status: DISCONTINUED | OUTPATIENT
Start: 2025-02-18 | End: 2025-02-18

## 2025-02-18 RX ADMIN — PROPOFOL 20 MG: 10 INJECTION, EMULSION INTRAVENOUS at 12:56

## 2025-02-18 RX ADMIN — PROPOFOL 120 MCG/KG/MIN: 10 INJECTION, EMULSION INTRAVENOUS at 12:50

## 2025-02-18 RX ADMIN — PROPOFOL 30 MG: 10 INJECTION, EMULSION INTRAVENOUS at 12:53

## 2025-02-18 RX ADMIN — PROPOFOL 150 MG: 10 INJECTION, EMULSION INTRAVENOUS at 12:52

## 2025-02-18 RX ADMIN — SODIUM CHLORIDE, SODIUM LACTATE, POTASSIUM CHLORIDE, AND CALCIUM CHLORIDE 125 ML/HR: .6; .31; .03; .02 INJECTION, SOLUTION INTRAVENOUS at 11:32

## 2025-02-18 RX ADMIN — PROPOFOL 20 MG: 10 INJECTION, EMULSION INTRAVENOUS at 12:54

## 2025-02-18 NOTE — PERIOPERATIVE NURSING NOTE
"Patient reported that his ride was unavailable due to the delayed procedure and timing.  Patient refused medically supervised transport despite education and encouragement.  Case Management notified, attempted roundtrip transportation and patient refused. Patient became agitated and stated \"you can't hold me here I am able to leave.\"  Reported calling his own [employer] to go back to his hotel where car was parked.  Patient left unattended at 1520.    "

## 2025-02-18 NOTE — ANESTHESIA POSTPROCEDURE EVALUATION
Post-Op Assessment Note    CV Status:  Stable  Pain Score: 0    Pain management: adequate       Mental Status:  Alert and awake   Hydration Status:  Euvolemic   PONV Controlled:  Controlled   Airway Patency:  Patent     Post Op Vitals Reviewed: Yes    No anethesia notable event occurred.    Staff: Anesthesiologist           Last Filed PACU Vitals:  Vitals Value Taken Time   Temp     Pulse     BP     Resp     SpO2

## 2025-02-18 NOTE — H&P
History and Physical -  [facility]  Huy Gunter Jr 49 y.o. male MRN: [id number]                  HPI: Huy Gunter Jr is a 49 y.o. year old male who presents for gerd, hx of prior TIF      REVIEW OF SYSTEMS: Per the HPI, and otherwise unremarkable.    Historical Information   Past Medical History:   Diagnosis Date    COVID-19 long hauler manifesting chronic cough 10/2021    Intubation x 11 weeks and PEG    GERD (gastroesophageal reflux disease)     Hiatal hernia      Past Surgical History:   Procedure Laterality Date    COLONOSCOPY  10/2019    Diverticulosis    EGD  01/25/2022    Mild irregular squamocolumnar junction no intestinal metaplasia, gastritis prepyloric, widely patent lower esophageal sphincter, small sliding hiatal hernia, fundic gland polyps    EGD  06/02/2022    Variable Z line and biopsy negative for intestinal metaplasia, gastritis with fundic gland polyp esophagus otherwise normal at [facility]    TRANSORAL INCISIONLESS FUNDOPLICATION (TIF)  08/08/2022    UPPER GASTROINTESTINAL ENDOSCOPY  10/2019    Possible short segment Bajwa's esophagus-biopsy negative for intestinal metaplasia, fundic gland polyps, focal eosinophils and esophagus normal duodenum     Social History   Social History     Substance and Sexual Activity   Alcohol Use Not Currently     Social History     Substance and Sexual Activity   Drug Use Never     Social History     Tobacco Use   Smoking Status Never   Smokeless Tobacco Never     Family History   Problem Relation Age of Onset    Ovarian cancer Mother        Meds/Allergies       Current Outpatient Medications:     famotidine (PEPCID) 40 MG tablet    fluticasone (FLONASE) 50 mcg/act nasal spray    lansoprazole (PREVACID) 30 mg capsule    sucralfate (CARAFATE) 1 g/10 mL suspension    traZODone (DESYREL) 150 mg tablet    albuterol (PROVENTIL HFA,VENTOLIN HFA) 90 mcg/act inhaler    ALPRAZolam (XANAX) 1 mg tablet    amitriptyline (ELAVIL)  100 mg tablet    azithromycin (ZITHROMAX) 250 mg tablet    budesonide (PULMICORT) 0.5 mg/2 mL nebulizer solution    busPIRone (BUSPAR) 5 mg tablet    ondansetron (ZOFRAN) 4 mg tablet    promethazine-dextromethorphan (PHENERGAN-DM) 6.25-15 mg/5 mL oral syrup    tadalafil (CIALIS) 5 MG tablet    valACYclovir (VALTREX) 500 mg tablet    Current Facility-Administered Medications:     lactated ringers infusion, 125 mL/hr, Intravenous, Continuous    lactated ringers infusion, 125 mL/hr, Intravenous, Continuous, 125 mL/hr at 02/18/25 1132    sodium chloride 0.9 % infusion, 125 mL/hr, Intravenous, Continuous    Allergies   Allergen Reactions    Penicillins Hives    Vancomycin Chest Pain       Objective     /83   Pulse 77   Resp 16   SpO2 96%       PHYSICAL EXAM    Gen: NAD  Head: NCAT  CV: RRR  CHEST: Clear  ABD: soft, NT/ND  EXT: no edema      ASSESSMENT/PLAN:  This is a 49 y.o. year old male here for egd, and he is stable and optimized for his procedure.

## 2025-02-18 NOTE — ANESTHESIA PREPROCEDURE EVALUATION
Procedure:  EGD    Relevant Problems   GI/HEPATIC   (+) GERD (gastroesophageal reflux disease)      Other   (+) COVID-19 long hauler manifesting chronic cough        Physical Exam    Airway    Mallampati score: I  TM Distance: >3 FB  Neck ROM: full     Dental       Cardiovascular  Cardiovascular exam normal    Pulmonary  Pulmonary exam normal     Other Findings        Anesthesia Plan  ASA Score- 2     Anesthesia Type- IV sedation with anesthesia with ASA Monitors.         Additional Monitors:     Airway Plan:            Plan Factors-Exercise tolerance (METS): >4 METS.    Chart reviewed.   Existing labs reviewed. Patient summary reviewed.    Patient is not a current smoker.              Induction- intravenous.    Postoperative Plan-     Perioperative Resuscitation Plan - Level 1 - Full Code.       Informed Consent- Anesthetic plan and risks discussed with patient.        NPO Status:  No vitals data found for the desired time range.

## 2025-02-19 ENCOUNTER — NURSE TRIAGE (OUTPATIENT)
Age: 50
End: 2025-02-19

## 2025-02-19 ENCOUNTER — TELEPHONE (OUTPATIENT)
Age: 50
End: 2025-02-19

## 2025-02-19 DIAGNOSIS — K21.00 GASTROESOPHAGEAL REFLUX DISEASE WITH ESOPHAGITIS WITHOUT HEMORRHAGE: ICD-10-CM

## 2025-02-19 DIAGNOSIS — K44.9 HIATAL HERNIA: Primary | ICD-10-CM

## 2025-02-19 NOTE — TELEPHONE ENCOUNTER
Pt called requesting for an order for Barium test placed. I called and spoke with Makayla. She will send a message to the provider to placed the order in.

## 2025-02-19 NOTE — TELEPHONE ENCOUNTER
"PT HAD EGD YESTERDAY, BARIUM SWALLOW STUDY WAS RECOMMENDED, PLEASE PLACE ORDER SO PT CAN SCHEDULE. THANK YOU.      Answer Assessment - Initial Assessment Questions  1. REASON FOR CALL: \"What is the main reason for your call?\" or \"How can I best help you?\"      PT HAD EGD YESTERDAY, BARIUM SWALLOW STUDY WAS RECOMMENDED, PLEASE PLACE ORDER SO PT CAN SCHEDULE. THANK YOU.    Protocols used: Information Only Call - No Triage-Adult-[place]    "

## 2025-02-20 PROCEDURE — 88305 TISSUE EXAM BY PATHOLOGIST: CPT | Performed by: STUDENT IN AN ORGANIZED HEALTH CARE EDUCATION/TRAINING PROGRAM

## 2025-02-24 ENCOUNTER — RESULTS FOLLOW-UP (OUTPATIENT)
Dept: GASTROENTEROLOGY | Facility: MEDICAL CENTER | Age: 50
End: 2025-02-24

## 2025-02-24 NOTE — RESULT ENCOUNTER NOTE
Inform patient via [employer].  Please review the pathology/lab result of further discussion.    Copied from [employer] message :       Debora Lewis,     Your stomach biopsies were unremarkable.  No infection was seen.  The esophagus biopsies which were done for difficulty swallowing were normal as well.  We will see what your barium swallow shows.    Best regards,     Olivier Medina MD

## 2025-02-27 ENCOUNTER — HOSPITAL ENCOUNTER (OUTPATIENT)
Dept: RADIOLOGY | Facility: HOSPITAL | Age: 50
End: 2025-02-27
Payer: COMMERCIAL

## 2025-02-27 DIAGNOSIS — K21.00 GASTROESOPHAGEAL REFLUX DISEASE WITH ESOPHAGITIS WITHOUT HEMORRHAGE: ICD-10-CM

## 2025-02-27 DIAGNOSIS — K44.9 HIATAL HERNIA: ICD-10-CM

## 2025-02-27 PROCEDURE — 74220 X-RAY XM ESOPHAGUS 1CNTRST: CPT

## 2025-03-01 ENCOUNTER — RESULTS FOLLOW-UP (OUTPATIENT)
Dept: GASTROENTEROLOGY | Facility: MEDICAL CENTER | Age: 50
End: 2025-03-01

## 2025-03-01 NOTE — RESULT ENCOUNTER NOTE
Inform patient via [employer].  Please review the pathology/lab result of further discussion.    Copied from [employer]Car message :       Debora Lewis,     Your esophagram/x-ray was normal.  There was no evidence of reflux seen either.    Best regards,     Olivier Medina MD

## 2025-03-09 PROBLEM — Z12.11 SCREENING FOR COLON CANCER: Status: RESOLVED | Noted: 2025-02-07 | Resolved: 2025-03-09

## 2025-03-24 ENCOUNTER — OFFICE VISIT (OUTPATIENT)
Dept: GASTROENTEROLOGY | Facility: MEDICAL CENTER | Age: 50
End: 2025-03-24
Payer: COMMERCIAL

## 2025-03-24 VITALS
BODY MASS INDEX: 28.38 KG/M2 | TEMPERATURE: 97.4 F | DIASTOLIC BLOOD PRESSURE: 120 MMHG | SYSTOLIC BLOOD PRESSURE: 155 MMHG | WEIGHT: 181.2 LBS | HEART RATE: 94 BPM

## 2025-03-24 DIAGNOSIS — K21.9 GASTROESOPHAGEAL REFLUX DISEASE, UNSPECIFIED WHETHER ESOPHAGITIS PRESENT: Primary | ICD-10-CM

## 2025-03-24 DIAGNOSIS — K44.9 HIATAL HERNIA: ICD-10-CM

## 2025-03-24 PROCEDURE — 99214 OFFICE O/P EST MOD 30 MIN: CPT | Performed by: INTERNAL MEDICINE

## 2025-03-24 RX ORDER — VONOPRAZAN FUMARATE 26.72 MG/1
20 TABLET ORAL DAILY
Qty: 60 TABLET | Refills: 0 | Status: SHIPPED | OUTPATIENT
Start: 2025-03-24 | End: 2025-05-23

## 2025-03-24 RX ORDER — SODIUM CHLORIDE, SODIUM LACTATE, POTASSIUM CHLORIDE, CALCIUM CHLORIDE 600; 310; 30; 20 MG/100ML; MG/100ML; MG/100ML; MG/100ML
125 INJECTION, SOLUTION INTRAVENOUS CONTINUOUS
OUTPATIENT
Start: 2025-03-24

## 2025-03-24 NOTE — PROGRESS NOTES
Name: Huy Gunter Jr      : 1975      MRN: [id number]  Encounter Provider: Olivier Medina MD  Encounter Date: 3/24/2025   Encounter department: [facility]  :  Assessment & Plan  Gastroesophageal reflux disease, unspecified whether esophagitis present    Orders:    Vonoprazan Fumarate (Voquezna) 20 MG TABS; Take 20 mg by mouth in the morning    Ambulatory Referral to Weight Management; Future    NM gastric emptying; Future    Esoph dual sens denise/24hr; Future    Hiatal hernia    Orders:    Ambulatory Referral to Weight Management; Future    Esoph dual sens denise/24hr; Future      Persistent symptoms of reflux despite TIF. Barium swallow was negative for reflux or any significant hiatal hernia. Not sure if symptoms are related to hypersensitive esophagus or actual reflux. Could also be having symptoms of gastroparesis.   GES  Manometry / pH   May need elavil.   Refer to Dr. Dexter Davidson  Discuss with Dr. Mazariegos at [place] as patient lives near there and would be easier for him to schedule things up there. He is able to get a BRAVO on him soon but not the pH/manometry.     History of Present Illness   Huy Gunter Jr is a 49 y.o. male who presents ho GERD post TIF.   He still has regurgitaion.   Has hoarseness  Has globus sensation as well.   No dysphagia  Is concerned about the symptoms affecting his daily activities.   He also feels full after eating.     HPI    Review of Systems   Constitutional: Negative.    HENT: Negative.     Eyes: Negative.    Respiratory: Negative.     Cardiovascular: Negative.    Gastrointestinal:         See HPI   Endocrine: Negative.    Genitourinary: Negative.    Musculoskeletal: Negative.    Skin: Negative.    Allergic/Immunologic: Negative.    Neurological: Negative.    Hematological: Negative.    Psychiatric/Behavioral: Negative.     All other systems reviewed and are negative.   A complete review of systems is negative other than that noted  above in the HPI.      Current Outpatient Medications   Medication Sig Dispense Refill    albuterol (PROVENTIL HFA,VENTOLIN HFA) 90 mcg/act inhaler       ALPRAZolam (XANAX) 1 mg tablet       budesonide (PULMICORT) 0.5 mg/2 mL nebulizer solution Inhale 2 mL every 12 (twelve) hours      famotidine (PEPCID) 40 MG tablet Take 1 tablet (40 mg total) by mouth daily at bedtime 90 tablet 1    fluticasone (FLONASE) 50 mcg/act nasal spray       lansoprazole (PREVACID) 30 mg capsule Take 1 capsule (30 mg total) by mouth 2 (two) times a day 180 capsule 0    tadalafil (CIALIS) 5 MG tablet       traZODone (DESYREL) 150 mg tablet       Vonoprazan Fumarate (Voquezna) 20 MG TABS Take 20 mg by mouth in the morning 60 tablet 0    amitriptyline (ELAVIL) 100 mg tablet  (Patient not taking: Reported on 9/16/2022)      azithromycin (ZITHROMAX) 250 mg tablet  (Patient not taking: Reported on 9/16/2022)      busPIRone (BUSPAR) 5 mg tablet       ondansetron (ZOFRAN) 4 mg tablet Take 1 tablet (4 mg total) by mouth every 8 (eight) hours as needed for nausea or vomiting (Patient not taking: Reported on 9/16/2022) 20 tablet 0    promethazine-dextromethorphan (PHENERGAN-DM) 6.25-15 mg/5 mL oral syrup  (Patient not taking: Reported on 9/16/2022)      sucralfate (CARAFATE) 1 g/10 mL suspension Take 10 mL (1 g total) by mouth 4 (four) times a day for 10 days 400 mL 0    valACYclovir (VALTREX) 500 mg tablet Take 1,000 mg by mouth (Patient not taking: Reported on 7/12/2022)       No current facility-administered medications for this visit.     Objective   BP (!) 155/120   Pulse 94   Temp (!) 97.4 °F (36.3 °C)   Wt 82.2 kg (181 lb 3.2 oz)   BMI 28.38 kg/m²     Physical Exam  Constitutional:       Appearance: Normal appearance. He is well-developed.   HENT:      Head: Normocephalic and atraumatic.   Eyes:      General: No scleral icterus.     Conjunctiva/sclera: Conjunctivae normal.      Pupils: Pupils are equal, round, and reactive to light.    Cardiovascular:      Rate and Rhythm: Normal rate and regular rhythm.      Heart sounds: Normal heart sounds.   Pulmonary:      Effort: Pulmonary effort is normal. No respiratory distress.      Breath sounds: Normal breath sounds.   Abdominal:      General: Bowel sounds are normal. There is no distension.      Palpations: Abdomen is soft. There is no mass.      Tenderness: There is no abdominal tenderness.      Hernia: No hernia is present.   Musculoskeletal:         General: Normal range of motion.      Cervical back: Normal range of motion.   Lymphadenopathy:      Cervical: No cervical adenopathy.   Skin:     General: Skin is warm.   Neurological:      Mental Status: He is alert and oriented to person, place, and time.   Psychiatric:         Behavior: Behavior normal.         Thought Content: Thought content normal.            Lab Results: I personally reviewed relevant lab results.       Results for orders placed during the hospital encounter of 02/18/25    EGD    Impression  The esophagus appeared dilated. Biopsies done.  Irregular Z-line; performed cold forceps biopsy to rule out Bajwa's.  Evidence of prior Transoral Incisionless Fundoplication (TIF) with fasteners still seen. The LES was patulous. No significant hernia was seen.      RECOMMENDATION:  Await pathology results  Recommend barium swallow study (he can have it done at [facility] or [facility]).  May consider manometry.  Follow up in office.            Olivier Medina MD

## 2025-03-24 NOTE — ASSESSMENT & PLAN NOTE
Orders:    Vonoprazan Fumarate (Voquezna) 20 MG TABS; Take 20 mg by mouth in the morning    Ambulatory Referral to Weight Management; Future    NM gastric emptying; Future    Esoph dual sens denise/24hr; Future

## 2025-03-26 ENCOUNTER — TELEPHONE (OUTPATIENT)
Dept: GASTROENTEROLOGY | Facility: HOSPITAL | Age: 50
End: 2025-03-26

## 2025-03-26 NOTE — TELEPHONE ENCOUNTER
Pt is calling to inform he's been ordered to stop reflux meds 7 days prior to his procedure . Pt states it's impossible as he is constantly throwing up as yesterday he threw up 7 times . Pt can attempt to hold for 1 or 2 days but wouldn't be able to tolerate 7 days .

## 2025-03-31 NOTE — TELEPHONE ENCOUNTER
He should cancel the appt altogether until Dr. Medina returns to the office (on 4/2). At that time, we can see what Dr. Medina would like the pt to do. Thank you

## 2025-03-31 NOTE — TELEPHONE ENCOUNTER
Pt calling in, he is scheduled for 24 hr pH study/ esophageal manometry test on 4/2 but can not stop his PPI.   Every time he tries to stop he gets sick, recently he tried to for 2 days and developed a sinus infection and bronchitis. He is on antibx for this.     Pt asking if he should rescheduled appt on 4/2. He states he can only hold it for 1-2 days.     Please advise if pt should cancel all together and not reschedule? He will not be able to hold meds for 7days as instructions state.

## 2025-03-31 NOTE — TELEPHONE ENCOUNTER
Pt caling back as no one got back to him regarding the hold on reflux med . Transferred to Nurse Jammie for further assistance .

## 2025-04-01 ENCOUNTER — TELEPHONE (OUTPATIENT)
Dept: GASTROENTEROLOGY | Facility: HOSPITAL | Age: 50
End: 2025-04-01

## 2025-04-10 ENCOUNTER — TELEPHONE (OUTPATIENT)
Dept: GASTROENTEROLOGY | Facility: HOSPITAL | Age: 50
End: 2025-04-10

## 2025-04-10 NOTE — PERIOPERATIVE NURSING NOTE
I have not received a call back from patient to get him schedule. When I called prior and today the call goes right to voice message.  I  left him a message a to call back   to assist in getting him scheduled for his manometry and 24 hr ph procedures. Informed that he can call manometry dept at  to get him on the schedule . He will need to be off PPI meds 2 days prior as he had agreed this with Dr. Medina. Patient can also call the GI office at  to get a visit.

## 2025-04-16 ENCOUNTER — TELEPHONE (OUTPATIENT)
Dept: GASTROENTEROLOGY | Facility: HOSPITAL | Age: 50
End: 2025-04-16

## 2025-04-16 NOTE — TELEPHONE ENCOUNTER
Pt called stated he was speaking with Cesilia to schedule eso manometry and call dropped. Transferred over to central scheduling

## 2025-04-16 NOTE — TELEPHONE ENCOUNTER
Patient returning call to schedule Manometry and 24hr procedure. I was going to help with scheduling when patient disconnected call. The manometry dept left their number to reschedule procedure. [phone].

## 2025-04-21 ENCOUNTER — HOSPITAL ENCOUNTER (OUTPATIENT)
Dept: GASTROENTEROLOGY | Facility: HOSPITAL | Age: 50
Discharge: HOME | End: 2025-04-21
Attending: INTERNAL MEDICINE
Payer: COMMERCIAL

## 2025-04-21 VITALS
SYSTOLIC BLOOD PRESSURE: 139 MMHG | RESPIRATION RATE: 16 BRPM | DIASTOLIC BLOOD PRESSURE: 94 MMHG | HEART RATE: 76 BPM | TEMPERATURE: 98.4 F | OXYGEN SATURATION: 97 %

## 2025-04-21 DIAGNOSIS — K21.9 GASTROESOPHAGEAL REFLUX DISEASE, UNSPECIFIED WHETHER ESOPHAGITIS PRESENT: ICD-10-CM

## 2025-04-21 DIAGNOSIS — K44.9 HIATAL HERNIA: ICD-10-CM

## 2025-04-21 PROCEDURE — 91038 ESOPH IMPED FUNCT TEST > 1HR: CPT

## 2025-04-21 PROCEDURE — 91010 ESOPHAGUS MOTILITY STUDY: CPT

## 2025-04-21 NOTE — PERIOPERATIVE NURSING NOTE
Patient brought in the room and explained the esophageal manometry procedure. After the confirmation of allergies, Lidocaine 2% viscous solution given via right/ left nostrils and  a transnasal insertion of the High Resolution esophageal manometry catheter was inserted via right nostril. Patient given water to drink during the insertion and once the catheter inserted pressure bands of both Upper esophageal sphincter  (UES) and Lower esophageal sphincter ( LES) were observed on the color contour. Patient instructed to take a deep breath to verify placement of the catheter, diaphragmatic pinch noted on inspiration. Catheter was secured to right cheek. Patient was assisted to supine position .Patient was instructed to relax  while acclimating the catheter for about 5 minutes. A 30 second baseline resting pressure was obtained to identify the UES and LES followed by a series of 10  wet liquid swallows using 5 cc room temperature normal saline to assess esophageal motility and bolus transit. Patient administered 10 wet viscous swallows using 5 cc viscous solution, 1 multiple rapid drink swallow using 2 cc room temperature normal saline given a total of 5 drinks. Patient instructed to sit up at the edge of the stretcher and given 5 upright liquid swallows using 5 cc room temperature normal saline and 1 rapid drink challenge using 200 cc room temperature water. At the end of the procedure the high resolution esophageal manometry catheter was removed from the nostril intact. Dual sensor PH probe inserted via right nostril and secured. Zephr recorder teachback performed and patient verbalized understanding. Patient instructed to return next day to have probe remove. Discharge instructions given and patient ambulated out of room in stable condition.

## 2025-04-28 PROCEDURE — 91038 ESOPH IMPED FUNCT TEST > 1HR: CPT | Performed by: INTERNAL MEDICINE

## 2025-04-28 PROCEDURE — 91010 ESOPHAGUS MOTILITY STUDY: CPT | Performed by: INTERNAL MEDICINE

## 2025-05-01 ENCOUNTER — CONSULT (OUTPATIENT)
Dept: BARIATRICS | Facility: CLINIC | Age: 50
End: 2025-05-01
Payer: COMMERCIAL

## 2025-05-01 VITALS
HEART RATE: 80 BPM | RESPIRATION RATE: 16 BRPM | BODY MASS INDEX: 27.87 KG/M2 | SYSTOLIC BLOOD PRESSURE: 130 MMHG | WEIGHT: 173.4 LBS | DIASTOLIC BLOOD PRESSURE: 90 MMHG | HEIGHT: 66 IN

## 2025-05-01 DIAGNOSIS — K21.9 HIATAL HERNIA WITH GERD: Primary | ICD-10-CM

## 2025-05-01 DIAGNOSIS — K21.9 GASTROESOPHAGEAL REFLUX DISEASE, UNSPECIFIED WHETHER ESOPHAGITIS PRESENT: ICD-10-CM

## 2025-05-01 DIAGNOSIS — K44.9 HIATAL HERNIA: ICD-10-CM

## 2025-05-01 DIAGNOSIS — K44.9 HIATAL HERNIA WITH GERD: Primary | ICD-10-CM

## 2025-05-01 PROCEDURE — 99204 OFFICE O/P NEW MOD 45 MIN: CPT | Performed by: SURGERY

## 2025-05-01 RX ORDER — POLYMYXIN B SULFATE AND TRIMETHOPRIM 1; 10000 MG/ML; [USP'U]/ML
SOLUTION OPHTHALMIC
COMMUNITY
Start: 2025-04-29

## 2025-05-01 RX ORDER — ZOLPIDEM TARTRATE 10 MG/1
10 TABLET ORAL
COMMUNITY

## 2025-05-01 RX ORDER — MONTELUKAST SODIUM 10 MG/1
10 TABLET ORAL DAILY
COMMUNITY

## 2025-05-01 RX ORDER — DIAZEPAM 5 MG/1
TABLET ORAL
COMMUNITY
Start: 2025-04-17

## 2025-05-01 RX ORDER — PANTOPRAZOLE SODIUM 40 MG/1
40 TABLET, DELAYED RELEASE ORAL EVERY 24 HOURS
COMMUNITY

## 2025-05-01 NOTE — ASSESSMENT & PLAN NOTE
Hiatal hernia present with a DeMeester score greater than 20-exposure time greater than 6% significant for pathologic acid reflux status post TIF    Continue reflux precautions and medical treatment per GI  Gastric emptying study pending  Return to office in September    Plan for laparoscopic hiatal hernia repair with or without sphincter augmentation.  Will obtain cardiac restratification prior.  Will discuss with GI.

## 2025-05-01 NOTE — PROGRESS NOTES
H&P Exam - Bariatric Surgery   Huy Gunter Jr 49 y.o. male MRN: [id number]  Unit/Bed#:  Encounter: [id number]    Assessment & Plan     Hiatal hernia with GERD  Hiatal hernia present with a DeMeester score greater than 20-exposure time greater than 6% significant for pathologic acid reflux status post TIF    Continue reflux precautions and medical treatment per GI  Gastric emptying study pending  Return to office in September    Plan for laparoscopic hiatal hernia repair with or without sphincter augmentation.  Will obtain cardiac restratification prior.  Will discuss with GI.        History of Present Illness     HPI:  Huy Gunter Jr is a 49 y.o. male who presents with GERD status post TIF.  In 2021 he was hospitalized for COVID and was in a coma for 16 weeks and had a trach and PEG.  He states his reflux started then.  He had a TIF in 2022 but he continues to have GERD.  Barium esophagram was normal.  EGD demonstrated a patulous lower esophageal sphincter.  24-hour pH with manometry study demonstrated normal esophageal motility and significant pathologic GERD.  He desires improved control.  He denies dysphagia.  He does complain of globus sensation at times.    Review of Systems   Gastrointestinal:         GERD   All other systems reviewed and are negative.   is negative except as noted above    Historical Information   Past Medical History:   Diagnosis Date    COVID-19 long hauler manifesting chronic cough 10/2021    Intubation x 11 weeks and PEG    GERD (gastroesophageal reflux disease)     Hiatal hernia      Past Surgical History:   Procedure Laterality Date    COLONOSCOPY  10/2019    Diverticulosis    EGD  01/25/2022    Mild irregular squamocolumnar junction no intestinal metaplasia, gastritis prepyloric, widely patent lower esophageal sphincter, small sliding hiatal hernia, fundic gland polyps    EGD  06/02/2022    Variable Z line and biopsy negative for intestinal metaplasia, gastritis with fundic  "gland polyp esophagus otherwise normal at [facility]    TRANSORAL INCISIONLESS FUNDOPLICATION (TIF)  08/08/2022    UPPER GASTROINTESTINAL ENDOSCOPY  10/2019    Possible short segment Bajwa's esophagus-biopsy negative for intestinal metaplasia, fundic gland polyps, focal eosinophils and esophagus normal duodenum     Social History   Social History     Substance and Sexual Activity   Alcohol Use Not Currently     Social History     Substance and Sexual Activity   Drug Use Never     Social History     Tobacco Use   Smoking Status Never   Smokeless Tobacco Never     Family History: Family history non-contributory    Meds/Allergies   all medications and allergies reviewed  Allergies   Allergen Reactions    Penicillins Hives    Vancomycin Chest Pain       Objective     Current Vitals:   Blood Pressure: 130/90 (05/01/25 0926)  Pulse: 80 (05/01/25 0926)  Respirations: 16 (05/01/25 0926)  Height: 5' 6.1\" (167.9 cm) (05/01/25 0926)  Weight - Scale: 78.7 kg (173 lb 6.4 oz) (05/01/25 0926)    [unfilled]    Physical Exam  Constitutional:       Appearance: Normal appearance.   HENT:      Head: Atraumatic.      Nose: No rhinorrhea.   Eyes:      Extraocular Movements: Extraocular movements intact.   Cardiovascular:      Rate and Rhythm: Normal rate.   Pulmonary:      Effort: Pulmonary effort is normal. No respiratory distress.   Abdominal:      General: Abdomen is flat. There is no distension.   Musculoskeletal:         General: Normal range of motion.      Cervical back: Normal range of motion.   Skin:     General: Skin is warm and dry.   Neurological:      General: No focal deficit present.      Mental Status: He is alert and oriented to person, place, and time.   Psychiatric:         Mood and Affect: Mood normal.         Behavior: Behavior normal.         Lab Results: I have personally reviewed pertinent lab results.    Imaging: Results Review Statement: I reviewed radiology reports from this admission " including: Barium esophagram.  EKG, Pathology, and Other Studies: Results Review Statement: I reviewed radiology reports from this admission including: EGD, 24-hour pH study, manometry.      Counseling / Coordination of Care  Total time spent today 45 minutes.  Greater than 50% of total time was spent with the patient, counseling and coordination of care.

## 2025-05-01 NOTE — LETTER
May 1, 2025     Lisha Tran DO  [address] [place] [address]    Patient: Huy Gunter Jr   YOB: 1975   Date of Visit: 5/1/2025       Dear DO Olivier Morales MD:    Thank you for referring Huy Gunter Jr to me for evaluation for GERD/Hiatal hernia. Below are my notes for this consultation.    If you have questions, please do not hesitate to call me. I look forward to following your patient along with you.         Sincerely,        Lilia Berkowitz MD        CC: MD Lilia Heller MD  5/1/2025 10:03 AM  Incomplete  H&P Exam - Bariatric Surgery   Huy Gunter Jr 49 y.o. male MRN: [id number]  Unit/Bed#:  Encounter: [id number]    Assessment & Plan        History of Present Illness    HPI:  Huy Gunter Jr is a 49 y.o. male who presents with GERD status post TIF.  In 2021 he was hospitalized for COVID and was in a coma for 16 weeks and had a trach and PEG.  He states his reflux started then.  He had a TIF in 2022 but he continues to have GERD.  Barium esophagram was normal.  EGD demonstrated a patulous lower esophageal sphincter.  24-hour pH with manometry study demonstrated normal esophageal motility and significant pathologic GERD.  He desires improved control.  He denies dysphagia.  He does complain of globus sensation at times.    Review of Systems   Gastrointestinal:         GERD   All other systems reviewed and are negative.   is negative except as noted above    Historical Information  Past Medical History:   Diagnosis Date   • COVID-19 long hauler manifesting chronic cough 10/2021    Intubation x 11 weeks and PEG   • GERD (gastroesophageal reflux disease)    • Hiatal hernia      Past Surgical History:   Procedure Laterality Date   • COLONOSCOPY  10/2019    Diverticulosis   • EGD  01/25/2022    Mild irregular squamocolumnar junction no intestinal metaplasia, gastritis prepyloric, widely patent lower esophageal  "sphincter, small sliding hiatal hernia, fundic gland polyps   • EGD  06/02/2022    Variable Z line and biopsy negative for intestinal metaplasia, gastritis with fundic gland polyp esophagus otherwise normal at [facility]   • TRANSORAL INCISIONLESS FUNDOPLICATION (TIF)  08/08/2022   • UPPER GASTROINTESTINAL ENDOSCOPY  10/2019    Possible short segment Bajwa's esophagus-biopsy negative for intestinal metaplasia, fundic gland polyps, focal eosinophils and esophagus normal duodenum     Social History  Social History     Substance and Sexual Activity   Alcohol Use Not Currently     Social History     Substance and Sexual Activity   Drug Use Never     Social History     Tobacco Use   Smoking Status Never   Smokeless Tobacco Never     Family History: Family history non-contributory    Meds/Allergies  all medications and allergies reviewed  Allergies   Allergen Reactions   • Penicillins Hives   • Vancomycin Chest Pain       Objective    Current Vitals:   Blood Pressure: 130/90 (05/01/25 0926)  Pulse: 80 (05/01/25 0926)  Respirations: 16 (05/01/25 0926)  Height: 5' 6.1\" (167.9 cm) (05/01/25 0926)  Weight - Scale: 78.7 kg (173 lb 6.4 oz) (05/01/25 0926)    [unfilled]    Physical Exam  Constitutional:       Appearance: Normal appearance.   HENT:      Head: Atraumatic.      Nose: No rhinorrhea.   Eyes:      Extraocular Movements: Extraocular movements intact.   Cardiovascular:      Rate and Rhythm: Normal rate.   Pulmonary:      Effort: Pulmonary effort is normal. No respiratory distress.   Abdominal:      General: Abdomen is flat. There is no distension.   Musculoskeletal:         General: Normal range of motion.      Cervical back: Normal range of motion.   Skin:     General: Skin is warm and dry.   Neurological:      General: No focal deficit present.      Mental Status: He is alert and oriented to person, place, and time.   Psychiatric:         Mood and Affect: Mood normal.         Behavior: Behavior " normal.         Lab Results: I have personally reviewed pertinent lab results.    Imaging: Results Review Statement: I reviewed radiology reports from this admission including: Barium esophagram.  EKG, Pathology, and Other Studies: Results Review Statement: I reviewed radiology reports from this admission including: EGD, 24-hour pH study, manometry.      Counseling / Coordination of Care  Total time spent today 45 minutes.  Greater than 50% of total time was spent with the patient, counseling and coordination of care.

## 2025-05-02 ENCOUNTER — RESULTS FOLLOW-UP (OUTPATIENT)
Dept: GASTROENTEROLOGY | Facility: MEDICAL CENTER | Age: 50
End: 2025-05-02

## 2025-05-22 ENCOUNTER — TELEPHONE (OUTPATIENT)
Age: 50
End: 2025-05-22

## 2025-05-22 NOTE — TELEPHONE ENCOUNTER
Pt called stated he got a call regarding his appt scheduled on 5-. As I was bringing up the pat's chart call dropped.     I called the patient back with no answer.

## 2025-05-22 NOTE — TELEPHONE ENCOUNTER
Patients GI provider:  Dr. Medina    Number to return call: [phone]    Reason for call: Pt calling to have script for Vonopraan Fumarate (Voquezna) 20 MG Tabs.     Scheduled procedure/appointment date if applicable: N/A

## 2025-05-30 ENCOUNTER — TELEPHONE (OUTPATIENT)
Age: 50
End: 2025-05-30

## 2025-05-30 DIAGNOSIS — K21.9 HIATAL HERNIA WITH GERD: ICD-10-CM

## 2025-05-30 DIAGNOSIS — K21.9 HIATAL HERNIA WITH GERD: Primary | ICD-10-CM

## 2025-05-30 DIAGNOSIS — K44.9 HIATAL HERNIA WITH GERD: Primary | ICD-10-CM

## 2025-05-30 DIAGNOSIS — K44.9 HIATAL HERNIA WITH GERD: ICD-10-CM

## 2025-05-30 RX ORDER — VONOPRAZAN FUMARATE 26.72 MG/1
20 TABLET ORAL DAILY
Qty: 30 TABLET | Refills: 3 | Status: SHIPPED | OUTPATIENT
Start: 2025-05-30 | End: 2025-05-30 | Stop reason: SDUPTHER

## 2025-05-30 RX ORDER — VONOPRAZAN FUMARATE 26.72 MG/1
10 TABLET ORAL DAILY
Qty: 30 TABLET | Refills: 3 | Status: SHIPPED | OUTPATIENT
Start: 2025-05-30

## 2025-05-30 NOTE — TELEPHONE ENCOUNTER
Patients GI provider:  Dr. Medina     Number to return call: [phone]    Reason for call: Pt calling requesting for a refill of voquezna through [facility]. I don't see it under outpatient medication . Please send a refill through [facility]    Scheduled procedure/appointment date if applicable: Apt/procedure

## 2025-06-02 NOTE — TELEPHONE ENCOUNTER
Pt on hold with Shruti. Shruti reports pt is calling for an update to his refill requests @ 10:34 AM. Informed Shruti his request was sent to the clinical team and marked as high priority. Awaiting clinical team's response. Shruti will relay this to the pt.

## 2025-06-02 NOTE — TELEPHONE ENCOUNTER
Pt called again stating [facility] told him the prescription for voquenza was cancelled. He states Ketty told him if Dr Medina calls he would not have to wait 3 to 4 days to get the medication. Pt states he has not had the medication for about 2 weeks. He states he almost passed out yesterday because the acid was so bad. He states he is not sure if it can be sent to the [facility] at [address] [facility] in [place]. Please call the pt back to advise

## 2025-06-02 NOTE — TELEPHONE ENCOUNTER
Patients GI provider:  Dr. Medina    Number to return call: [phone]    Reason for call: Pt called in requesting Vonoprazan Fumarate sent to [facility] located in [place], [place]. Patient states he called [facility] and they stated the provider canceled prescription. Patient stated he has been without the medication for a couple of weeks and is starting to have symptoms. Please reach out to patient when prescription has been sent, thank you.    Scheduled procedure/appointment date if applicable: Apt/procedure n/a

## 2025-06-02 NOTE — TELEPHONE ENCOUNTER
Spoke to veerardo escobedo from [facility] she stated she did received order from  waiting for pt to pay for the medication so it can be send -spoke to pt he will be calling [facility] to get more information regarding oop ,maybe would need the script to be send at a different location

## 2025-08-05 ENCOUNTER — HOSPITAL ENCOUNTER (OUTPATIENT)
Dept: NUCLEAR MEDICINE | Facility: HOSPITAL | Age: 50
Discharge: HOME | End: 2025-08-05
Attending: INTERNAL MEDICINE
Payer: MEDICARE

## 2025-08-05 DIAGNOSIS — K21.9 GASTROESOPHAGEAL REFLUX DISEASE, UNSPECIFIED WHETHER ESOPHAGITIS PRESENT: ICD-10-CM

## 2025-08-05 PROCEDURE — A9541 TC99M SULFUR COLLOID: HCPCS

## 2025-08-05 PROCEDURE — 78264 GASTRIC EMPTYING IMG STUDY: CPT
